# Patient Record
Sex: FEMALE | Race: WHITE | Employment: OTHER | URBAN - METROPOLITAN AREA
[De-identification: names, ages, dates, MRNs, and addresses within clinical notes are randomized per-mention and may not be internally consistent; named-entity substitution may affect disease eponyms.]

---

## 2018-07-01 ENCOUNTER — HOSPITAL ENCOUNTER (OUTPATIENT)
Dept: OTHER | Age: 68
Discharge: HOME | End: 2018-07-02

## 2018-07-02 ENCOUNTER — HOSPITAL ENCOUNTER (OUTPATIENT)
Dept: PHYSICAL THERAPY | Age: 68
Discharge: HOME | End: 2018-07-03

## 2018-07-02 ASSESSMENT — PAIN SCALES - GENERAL: PAINLEVEL_OUTOF10: 1

## 2018-07-02 NOTE — PROGRESS NOTES
Outpatient Physical Therapy  Phone: [phone] Fax: [phone]     To: Edenilson Wellington     From: Rosendo Bowser PT     Patient: Anselmo Galaviz      : 1950  Diagnosis: LBP, SI joint Dysfunction   Date: 2018  Treatment Diagnosis:      Physical Therapy Certification/Re-Certification Form  Dear Dr. Dano Ewing,  The following patient has been evaluated for physical therapy services and for therapy to continue, Medicare requires monthly physician review of the treatment plan. Please review the attached evaluation and/or summary of the patient's plan of care, and verify that you agree therapy should continue by signing the attached document and sending it back to our office. Plan of Care/Treatment to date:  [x] Therapeutic Exercise   [] Modalities:  [x] Therapeutic Activity     [] Ultrasound  [] Electrical Stimulation   [x] Gait Training      [] Cervical Traction [] Lumbar Traction  [x] Neuromuscular Re-education  [] Hot/Coldpack [] Iontophoresis    [x] Instruction in HEP      Other:  [x] Manual Therapy       []                        [] Aquatic Therapy       []                      ? Frequency/Duration:  # Days per week: [] 1 day # Weeks: [] 1 week [] 5 weeks      [x] 2 days? [] 2 weeks [] 6 weeks     [] 3 days   [] 3 weeks [] 7 weeks     [] 4 days   [x] 4 weeks [] 8 weeks    Rehab Potential: [] Excellent [x] Good [] Fair  [] Poor       Electronically signed by:  Rosendo Bowser PT      If you have any questions or concerns, please don't hesitate to call.   Thank you for your referral.    Physician Signature:________________________________Date:__________________  By signing above, therapists plan is approved by physician

## 2018-07-05 ENCOUNTER — HOSPITAL ENCOUNTER (OUTPATIENT)
Dept: PHYSICAL THERAPY | Age: 68
Discharge: HOME | End: 2018-07-06

## 2018-07-05 NOTE — FLOWSHEET NOTE
Physical Therapy Daily Treatment Note    Date:  2018    Patient Name:  Mei Fishman    :  1950  MRN: [id number]  Restrictions/Precautions:    Medical/Treatment Diagnosis Information:  Diagnosis: R SI Joint, LBP  Insurance/Certification information:  PT Insurance Information: Medicare  Physician Information:  Referring Practitioner: Alban Boggs  Plan of care signed (Y/N):  Y  Visit# / total visits:  2/8    G-Code (if applicable):         PT G-Codes  Functional Assessment Tool Used: Modified Oswestry  Score: 28% disability  Functional Limitation: Mobility: Walking and moving around  Mobility: Walking and Moving Around Current Status (): At least 20 percent but less than 40 percent impaired, limited or restricted  Mobility: Walking and Moving Around Goal Status (): At least 1 percent but less than 20 percent impaired, limited or restricted    Medicare Cap (if applicable):  307 = total amount used, updated 2018    Time in:  9:30     Timed Treatment: 30 Total Treatment Time:  30  ________________________________________________________________________________________    Pain Level:    1/10  SUBJECTIVE:  Pt reports that she is still feeling pain with walking on lateral R hip and has had to start stepping to with stairs. Pt reports that she has been doing HEP but without glute set for bridges and clamshells. OBJECTIVE:     Exercise/Equipment Resistance/Repetitions Other comments          TA set with BP cuff       With march       With KFO   Until achieved  x10  Instruction x2 min HEP video     Bridges 10x5\" HEP video     Clamshells x10 B HEP video    TG  x6 mins                   R hip IR/ER neuro re-ed   x8 mins                                                                                      Other Therapeutic Activities:      Manual Treatments:     Lumbar gapping gr 3 and 4 L2, L3, L4.  T6 distraction without cavitation set up only without thrust.  Prone lumbar rotation mobs

## 2018-07-11 ENCOUNTER — HOSPITAL ENCOUNTER (OUTPATIENT)
Dept: PHYSICAL THERAPY | Age: 68
Discharge: HOME | End: 2018-07-12

## 2018-07-11 NOTE — FLOWSHEET NOTE
Physical Therapy Daily Treatment Note    Date:  2018    Patient Name:  Jerardo Abbott    :  1950  MRN: [id number]  Restrictions/Precautions:    Medical/Treatment Diagnosis Information:  Diagnosis: R SI Joint, LBP  Insurance/Certification information:  PT Insurance Information: Medicare  Physician Information:  Referring Practitioner: Mary Sanchez  Plan of care signed (Y/N):  Y  Visit# / total visits:  3/8    G-Code (if applicable):         PT G-Codes  Functional Assessment Tool Used: Modified Oswestry  Score: 28% disability  Functional Limitation: Mobility: Walking and moving around  Mobility: Walking and Moving Around Current Status (): At least 20 percent but less than 40 percent impaired, limited or restricted  Mobility: Walking and Moving Around Goal Status (): At least 1 percent but less than 20 percent impaired, limited or restricted    Medicare Cap (if applicable):  174 = total amount used, updated 2018    Time in:  7:30     Timed Treatment: 30 Total Treatment Time:  30  ________________________________________________________________________________________    Pain Level:    1/10  SUBJECTIVE:  Pt reports feeling better after therapy last session and is now having times where she can go up steps with reciprocal gait. However, she does note significant pain in the buttocks and into the R LE the day following therapy. OBJECTIVE:     Exercise/Equipment Resistance/Repetitions Other comments          TA set with BP cuff       With march       With KFO   Until achieved  x10  nv HEP video     Bridges 10x5\" HEP video     Clamshells x10 B HEP video    TG  x6 mins                   R hip IR/ER neuro re-ed   x8 mins                                                                                      Other Therapeutic Activities: Pt instructed on morning HEP routine x10 mins    Manual Treatments:     Lumbar gapping gr 3 and 4 L2, L3, L4.  T6 distraction with cavitation set up only without thrust.  Prone lumbar rotation mobs gr 3-4. Prone gr 4 R L2 mob. Modalities:      Test/Measurements:      R  L  Hip  ER  25 degrees  25 degrees    IR  15 degrees 25 degrees        ASSESSMENT:     Pt tolerated treatment well. Pt demonstrated improved R hip ROM following manual therapy this session. Pt demonstrated improved control of TA activation without compensation from other abdominal musculature this session. Continue to progress POC as tolerated. Treatment/Activity Tolerance:   [x] Patient tolerated treatment well [] Patient limited by fatique  [] Patient limited by pain [] Patient limited by other medical complications  [] Other:     Goals:    Short term goals  Time Frame for Short term goals: 4 weeks  Short term goal 1: Pt independent with HEP  Short term goal 2: Pt to demonstrate segmental motion in lumbar spine with functional movement.     Short term goal 3: Gluteal strength to improve by 1/3 muscle grade R   Short term goal 4: Full AROM R hip all motions grossly  Short term goal 5: Up/down steps without limits to pain          Plan: [x] Continue per plan of care [] Alter current plan (see comments)   [] Plan of care initiated [] Hold pending MD visit [] Discharge      Plan for Next Session:      Re-Certification Due Date:         Signature:  Clayton Mejia, PT

## 2018-07-13 ENCOUNTER — HOSPITAL ENCOUNTER (OUTPATIENT)
Dept: PHYSICAL THERAPY | Age: 68
Discharge: HOME | End: 2018-07-14

## 2018-07-13 NOTE — FLOWSHEET NOTE
Physical Therapy Daily Treatment Note    Date:  2018    Patient Name:  Poppy Carrillo    :  1950  MRN: [id number]  Restrictions/Precautions:    Medical/Treatment Diagnosis Information:  Diagnosis: R SI Joint, LBP  Insurance/Certification information:  PT Insurance Information: Medicare  Physician Information:  Referring Practitioner: Berta Vela  Plan of care signed (Y/N):  Y  Visit# / total visits:      G-Code (if applicable):         PT G-Codes  Functional Assessment Tool Used: Modified Oswestry  Score: 28% disability  Functional Limitation: Mobility: Walking and moving around  Mobility: Walking and Moving Around Current Status (): At least 20 percent but less than 40 percent impaired, limited or restricted  Mobility: Walking and Moving Around Goal Status (): At least 1 percent but less than 20 percent impaired, limited or restricted    Medicare Cap (if applicable):  790 = total amount used, updated 2018    Time in:  7:25     Timed Treatment: 30 Total Treatment Time:  30  ________________________________________________________________________________________    Pain Level:    110  SUBJECTIVE: Pt reports her pain is significantly reduced but she is 'sore' in the R hip laterally. \"much rather be sore than painful'.        OBJECTIVE:     Exercise/Equipment Resistance/Repetitions Other comments          TA set with BP cuff       With march       With KFO   Reviewed x2 min HEP video   Bridges 10x5\" HEP video   Clamshells 1 and 2 x10 B review HEP video   TG  x6 mins     Standing stacking with MB   2x30 secs B           R hip IR/ER neuro re-ed              Supine anterior sling       Posterior sling     x2 mins B  x2 mins B                     Rockerboard nv      Standing hip abd  nv                                         Other Therapeutic Activities: Reviewed and performed morning HEP routine x10 mins    Manual Treatments:         Modalities:      Test/Measurements:

## 2018-07-17 ENCOUNTER — HOSPITAL ENCOUNTER (OUTPATIENT)
Dept: PHYSICAL THERAPY | Age: 68
Setting detail: THERAPIES SERIES
Discharge: HOME | End: 2018-07-17
Payer: MEDICARE

## 2018-07-17 PROCEDURE — 97110 THERAPEUTIC EXERCISES: CPT

## 2018-07-17 NOTE — FLOWSHEET NOTE
Physical Therapy Daily Treatment Note    Date:  2018    Patient Name:  Anselmo Galaviz    :  1950  MRN: [id number]  Restrictions/Precautions:    Medical/Treatment Diagnosis Information:  Diagnosis: R SI Joint, LBP  Insurance/Certification information:  PT Insurance Information: Medicare  Physician Information:  Referring Practitioner: Edenilson Wellington  Plan of care signed (Y/N):  Y  Visit# / total visits:      G-Code (if applicable):         PT G-Codes  Functional Assessment Tool Used: Modified Oswestry  Score: 28% disability  Functional Limitation: Mobility: Walking and moving around  Mobility: Walking and Moving Around Current Status (): At least 20 percent but less than 40 percent impaired, limited or restricted  Mobility: Walking and Moving Around Goal Status (): At least 1 percent but less than 20 percent impaired, limited or restricted    Medicare Cap (if applicable):  810 = total amount used, updated 2018    Time in:  8:30     Timed Treatment: 30 Total Treatment Time:  30  ________________________________________________________________________________________    Pain Level:    1/10  SUBJECTIVE: Pt reports she is feeling much better and states her morning routine is really helping her.     OBJECTIVE:     Exercise/Equipment Resistance/Repetitions Other comments          TA set with BP cuff       With march       With KFO   Reviewed x5 min with dowel barry HEP video   Bridges 6x5 secs  HEP video   Clamshells 1 and 2  HEP video   TG  x6 mins     Standing stacking with MB   2x30 secs B           R hip IR/ER neuro re-ed              Supine anterior sling       Posterior sling     x3 mins B  x3 mins B    Magnetic click with feet on TB    x30 secs              Rockerboard nv      Standing hip abd  nv                                         Other Therapeutic Activities: Reviewed and performed morning HEP routine x10 mins    Manual Treatments:         Modalities:

## 2018-07-19 ENCOUNTER — HOSPITAL ENCOUNTER (OUTPATIENT)
Dept: PHYSICAL THERAPY | Age: 68
Setting detail: THERAPIES SERIES
Discharge: HOME | End: 2018-07-19
Payer: MEDICARE

## 2018-07-19 PROCEDURE — 97140 MANUAL THERAPY 1/> REGIONS: CPT

## 2018-07-19 PROCEDURE — 97110 THERAPEUTIC EXERCISES: CPT

## 2018-07-19 NOTE — FLOWSHEET NOTE
Physical Therapy Daily Treatment Note    Date:  2018    Patient Name:  Derek Garza    :  1950  MRN: [id number]  Restrictions/Precautions:    Medical/Treatment Diagnosis Information:  Diagnosis: R SI Joint, LBP  Insurance/Certification information:  PT Insurance Information: Medicare  Physician Information:  Referring Practitioner: Sheryl Nunez  Plan of care signed (Y/N):  Y  Visit# / total visits:      G-Code (if applicable):         PT G-Codes  Functional Assessment Tool Used: Modified Oswestry  Score: 28% disability  Functional Limitation: Mobility: Walking and moving around  Mobility: Walking and Moving Around Current Status (): At least 20 percent but less than 40 percent impaired, limited or restricted  Mobility: Walking and Moving Around Goal Status (): At least 1 percent but less than 20 percent impaired, limited or restricted    Medicare Cap (if applicable):  586 = total amount used, updated 2018    Time in:  9:30     Timed Treatment: 30 Total Treatment Time:  30  ________________________________________________________________________________________    Pain Level:    1/10  SUBJECTIVE:   Pt reports increased pain in RLE after carrying a hose yesterday.     OBJECTIVE:     Exercise/Equipment Resistance/Repetitions Other comments          TA set with BP cuff       With march       With KFO   Reviewed x5 min with dowel barry HEP video   Bridges   HEP video   Clamshells 1 and 2  HEP video   TG  x6 mins     Standing stacking with MB       3 ball compression   2x30 secs forward  10x5 secs diagonally    R hip IR/ER neuro re-ed              Supine anterior sling       Posterior sling     x3 mins B  x3 mins B    Magnetic click with feet on TB                 Rockerboard F/B and M/L rocking and balance 1'      Standing hip abd  2# 2x15 B                                         Other Therapeutic Activities: Reviewed and performed morning HEP routine x10 mins    Manual Treatments:     Lumbar gapping gr 3 and 4 L2, L3, L4. .  Prone lumbar rotation mobs gr 3-4. STM R QL followed by manual stretching. Modalities:      Test/Measurements:      R  L  Hip  ER  25 degrees  25 degrees    IR  15 degrees 25 degrees        ASSESSMENT:     Pt doing well with therapy overall. Significant tenderness noted in R QL and lumbar paraspinals this session. Pt demonstrated decreased pain following manual therapy. Treatment this session focused on correct posture with functional activity to prevent flare ups. Pt able to demonstrate core activation in standing this session. Progress standing exercise nv. Treatment/Activity Tolerance:   [x] Patient tolerated treatment well [] Patient limited by fatique  [] Patient limited by pain [] Patient limited by other medical complications  [] Other:     Goals:    Short term goals  Time Frame for Short term goals: 4 weeks  Short term goal 1: Pt independent with HEP  Short term goal 2: Pt to demonstrate segmental motion in lumbar spine with functional movement. Short term goal 3: Gluteal strength to improve by 1/3 muscle grade R   Short term goal 4: Full AROM R hip all motions grossly  Short term goal 5: Up/down steps without limits to pain          Plan: [x] Continue per plan of care [] Alter current plan (see comments)   [] Plan of care initiated [] Hold pending MD visit [] Discharge      Plan for Next Session:      Re-Certification Due Date:         Signature:  Dennis Joyce PT    Misti Loomis, SPT Physical Therapist was present, directed the patient's care, made skilled judgement, and was responsible for assessment and treatment of the patient.

## 2018-07-31 ENCOUNTER — HOSPITAL ENCOUNTER (OUTPATIENT)
Dept: PHYSICAL THERAPY | Age: 68
Setting detail: THERAPIES SERIES
Discharge: HOME | End: 2018-07-31
Payer: MEDICARE

## 2018-07-31 PROCEDURE — 97140 MANUAL THERAPY 1/> REGIONS: CPT

## 2018-07-31 PROCEDURE — 97110 THERAPEUTIC EXERCISES: CPT

## 2018-07-31 NOTE — FLOWSHEET NOTE
2x15 B           Standing lateral sling with maroon TB   x10 B     Standing multifidus with maroon TB   x15 B                     Other Therapeutic Activities: Reviewed and performed morning HEP routine x10 mins    Manual Treatments:     Neural STM and flossing R sciatic gluteal into gastroc. Gr 3-4 M/L R tibio-femoral glides. Modalities:      Test/Measurements:      R  L  Hip  ER  25 degrees  25 degrees    IR  15 degrees 25 degrees        ASSESSMENT:     Pt doing well with therapy overall. Treatment/Activity Tolerance:   [x] Patient tolerated treatment well [] Patient limited by fatique  [] Patient limited by pain [] Patient limited by other medical complications  [] Other:     Goals:    Short term goals  Time Frame for Short term goals: 4 weeks  Short term goal 1: Pt independent with HEP  Short term goal 2: Pt to demonstrate segmental motion in lumbar spine with functional movement. Short term goal 3: Gluteal strength to improve by 1/3 muscle grade R   Short term goal 4: Full AROM R hip all motions grossly  Short term goal 5: Up/down steps without limits to pain          Plan: [x] Continue per plan of care [] Alter current plan (see comments)   [] Plan of care initiated [] Hold pending MD visit [] Discharge      Plan for Next Session:      Re-Certification Due Date:         Signature:  Duane Quintero PT    Jean Jo, LEISA Physical Therapist was present, directed the patient's care, made skilled judgement, and was responsible for assessment and treatment of the patient.

## 2018-08-03 ENCOUNTER — HOSPITAL ENCOUNTER (OUTPATIENT)
Dept: PHYSICAL THERAPY | Age: 68
Setting detail: THERAPIES SERIES
Discharge: HOME | End: 2018-08-03
Payer: MEDICARE

## 2018-08-03 PROCEDURE — 97110 THERAPEUTIC EXERCISES: CPT

## 2018-08-03 PROCEDURE — 97140 MANUAL THERAPY 1/> REGIONS: CPT

## 2018-08-06 ENCOUNTER — HOSPITAL ENCOUNTER (OUTPATIENT)
Dept: PHYSICAL THERAPY | Age: 68
Setting detail: THERAPIES SERIES
Discharge: HOME | End: 2018-08-06
Payer: MEDICARE

## 2018-08-06 PROCEDURE — 97140 MANUAL THERAPY 1/> REGIONS: CPT

## 2018-08-06 PROCEDURE — 97110 THERAPEUTIC EXERCISES: CPT

## 2018-08-06 NOTE — FLOWSHEET NOTE
multifidus with maroon TB   x15 B     Standing posterior sling   x15 B with maroon TB 6\" step             Other Therapeutic Activities: Reviewed and performed morning HEP routine x10 mins    Manual Treatments:     Lumbar gapping gr 3 and 4 L2, L3, L4. T6 distraction without cavitation set up only without thrust.  Prone lumbar rotation mobs gr 3-4. Neural STM and flossing R sciatic gluteal into gastroc. Dyer Archbald LAD B LE followed by B hip IR/ER neuro re-ed. Modalities:      Test/Measurements:      R  L  Hip  ER  25 degrees  25 degrees    IR  15 degrees 25 degrees        ASSESSMENT:     Pt doing well with therapy overall with decreased pain and improved mobility. Continue to progress POC as tolerated. Treatment/Activity Tolerance:   [x] Patient tolerated treatment well [] Patient limited by fatique  [] Patient limited by pain [] Patient limited by other medical complications  [] Other:     Goals:    Short term goals  Time Frame for Short term goals: 4 weeks  Short term goal 1: Pt independent with HEP  (met)  Short term goal 2: Pt to demonstrate segmental motion in lumbar spine with functional movement.   (improved)  Short term goal 3: Gluteal strength to improve by 1/3 muscle grade R (improving)  Short term goal 4: Full AROM R hip all motions grossly (improved)  Short term goal 5: Up/down steps without limits to pain  (improved)          Plan: [x] Continue per plan of care [] Alter current plan (see comments)   [] Plan of care initiated [] Hold pending MD visit [] Discharge      Plan for Next Session:      Re-Certification Due Date:         Signature:  Duane Quintero PT

## 2018-08-08 ENCOUNTER — HOSPITAL ENCOUNTER (OUTPATIENT)
Dept: PHYSICAL THERAPY | Age: 68
Setting detail: THERAPIES SERIES
Discharge: HOME | End: 2018-08-08
Payer: MEDICARE

## 2018-08-08 PROCEDURE — 97140 MANUAL THERAPY 1/> REGIONS: CPT

## 2018-08-08 PROCEDURE — 97110 THERAPEUTIC EXERCISES: CPT

## 2018-08-08 NOTE — FLOWSHEET NOTE
Physical Therapy Daily Treatment Note    Date:  2018    Patient Name:  Leila Friedman    :  1950  MRN: [id number]  Restrictions/Precautions:    Medical/Treatment Diagnosis Information:  Diagnosis: R SI Joint, LBP  Insurance/Certification information:  PT Insurance Information: Medicare  Physician Information:  Referring Practitioner: Edgardo Dougherty  Plan of care signed (Y/N):  Y  Visit# / total visits:      G-Code (if applicable):         PT G-Codes  Functional Assessment Tool Used: Modified Oswestry  Score: 8/3/18   12% disability   At initial evaluation 28% disability  Functional Limitation: Mobility: Walking and moving around  Mobility: Walking and Moving Around Current Status (): At least 1 percent but less than 20 percent impaired, limited or restricted  Mobility: Walking and Moving Around Goal Status (): At least 1 percent but less than 20 percent impaired, limited or restricted    Medicare Cap (if applicable):  544 = total amount used, updated 2018    Time in:  8:20     Timed Treatment: 40 Total Treatment Time:  40  ________________________________________________________________________________________    Pain Level:    3-4/10  SUBJECTIVE:  Pt reports having 'problems with the R hip again since yesterday.'  Pain intensity isn't too bad.       OBJECTIVE:      Exercise/Equipment Resistance/Repetitions Other comments          TA set with BP cuff       With march       With KFO    HEP video   Bridges   HEP video   Clamshells 1 and 2  HEP video   TG  x6 mins     Standing stacking with MB       3 ball compression   2x30 secs forward  10x5 secs diagonally    R hip IR/ER neuro re-ed              Supine anterior sling       Posterior sling       x3 mins B    Magnetic click with feet on TB                 Rockerboard x1 min F/B M/L  x1 min rocking each      Standing hip abd             Standing lateral sling with maroon TB   x10 B     Standing multifidus with maroon TB   x15 B Standing posterior sling                Other Therapeutic Activities: Reviewed and performed morning HEP routine x10 mins    Manual Treatments:     Lumbar gapping gr 3 and 4 L2, L3, L4. T6 distraction without cavitation set up only without thrust.  Prone lumbar rotation mobs gr 3-4. Neural STM and flossing R sciatic gluteal into gastroc. Maryann Estrada LAD B LE followed by B hip IR/ER neuro re-ed. R ITB STM and ART followed by manual stretching. Modalities:      Test/Measurements:      R  L  Hip  ER  25 degrees  25 degrees    IR  15 degrees 25 degrees     L sacral SB        ASSESSMENT:    Pt responded well to manual therapy today with decreased R thigh and hip pain to 0/10. Increased ITB tightness noted in R that improved with manual release. Continue to progress POC as tolerated. Treatment/Activity Tolerance:   [x] Patient tolerated treatment well [] Patient limited by fatique  [] Patient limited by pain [] Patient limited by other medical complications  [] Other:     Goals:    Short term goals  Time Frame for Short term goals: 4 weeks  Short term goal 1: Pt independent with HEP  (met)  Short term goal 2: Pt to demonstrate segmental motion in lumbar spine with functional movement.   (improved)  Short term goal 3: Gluteal strength to improve by 1/3 muscle grade R (improving)  Short term goal 4: Full AROM R hip all motions grossly (improved)  Short term goal 5: Up/down steps without limits to pain  (improved)          Plan: [x] Continue per plan of care [] Alter current plan (see comments)   [] Plan of care initiated [] Hold pending MD visit [] Discharge      Plan for Next Session:      Re-Certification Due Date:         Signature:  Janice Cavanaugh, PT

## 2018-08-14 ENCOUNTER — HOSPITAL ENCOUNTER (OUTPATIENT)
Dept: PHYSICAL THERAPY | Age: 68
Setting detail: THERAPIES SERIES
Discharge: HOME | End: 2018-08-14
Payer: MEDICARE

## 2018-08-14 PROCEDURE — 97110 THERAPEUTIC EXERCISES: CPT

## 2018-08-14 PROCEDURE — 97140 MANUAL THERAPY 1/> REGIONS: CPT

## 2018-08-14 NOTE — FLOWSHEET NOTE
Standing posterior sling   x10 B              Other Therapeutic Activities: Reviewed and performed morning HEP routine x10 mins    Manual Treatments:     Lumbar gapping gr 3 and 4 L2, L3, L4. Prone lumbar rotation mobs gr 3-4. R ITB STM and ART followed by manual stretching. Modalities:      Test/Measurements:      R  L  Hip  ER  25 degrees  25 degrees    IR  15 degrees 25 degrees     L sacral SB        ASSESSMENT:    Pt responded well to manual therapy today with decreased R thigh and hip pain to 0/10. Increased ITB tightness noted in R that improved with manual release. Continue to progress POC as tolerated. Treatment/Activity Tolerance:   [x] Patient tolerated treatment well [] Patient limited by fatique  [] Patient limited by pain [] Patient limited by other medical complications  [] Other:     Goals:    Short term goals  Time Frame for Short term goals: 4 weeks  Short term goal 1: Pt independent with HEP  (met)  Short term goal 2: Pt to demonstrate segmental motion in lumbar spine with functional movement.   (improved)  Short term goal 3: Gluteal strength to improve by 1/3 muscle grade R (improving)  Short term goal 4: Full AROM R hip all motions grossly (improved)  Short term goal 5: Up/down steps without limits to pain  (improved)          Plan: [x] Continue per plan of care [] Alter current plan (see comments)   [] Plan of care initiated [] Hold pending MD visit [] Discharge      Plan for Next Session:      Re-Certification Due Date:         Signature:  Wille Merlin, PT

## 2018-08-16 ENCOUNTER — HOSPITAL ENCOUNTER (OUTPATIENT)
Dept: PHYSICAL THERAPY | Age: 68
Setting detail: THERAPIES SERIES
Discharge: HOME | End: 2018-08-16
Payer: MEDICARE

## 2018-08-16 PROCEDURE — 97140 MANUAL THERAPY 1/> REGIONS: CPT

## 2018-08-16 PROCEDURE — 97110 THERAPEUTIC EXERCISES: CPT

## 2018-08-16 NOTE — FLOWSHEET NOTE
with maroon TB       Standing multifidus with maroon TB       Standing posterior sling                Other Therapeutic Activities: Reviewed and performed morning HEP routine x10 mins    Manual Treatments:     Lumbar gapping gr 3 and 4 L2, L3, L4. Prone lumbar rotation mobs gr 3-4. R ITB STM and ART followed by manual stretching. R hip hit technique. Modalities:      Test/Measurements:      R  L  Hip  ER  25 degrees  25 degrees    IR  15 degrees 25 degrees     L sacral SB        ASSESSMENT:    Pt responded well to manual therapy today with decreased R thigh and hip pain to 0/10. Increased ITB tightness noted in R that improved with manual release. Continue to progress POC as tolerated. Treatment/Activity Tolerance:   [x] Patient tolerated treatment well [] Patient limited by fatique  [] Patient limited by pain [] Patient limited by other medical complications  [] Other:     Goals:    Short term goals  Time Frame for Short term goals: 4 weeks  Short term goal 1: Pt independent with HEP  (met)  Short term goal 2: Pt to demonstrate segmental motion in lumbar spine with functional movement.   (improved)  Short term goal 3: Gluteal strength to improve by 1/3 muscle grade R (improving)  Short term goal 4: Full AROM R hip all motions grossly (improved)  Short term goal 5: Up/down steps without limits to pain  (improved)          Plan: [x] Continue per plan of care [] Alter current plan (see comments)   [] Plan of care initiated [] Hold pending MD visit [] Discharge      Plan for Next Session:      Re-Certification Due Date:         Signature:  Kirsten Goel, PT

## 2018-08-20 ENCOUNTER — HOSPITAL ENCOUNTER (OUTPATIENT)
Dept: PHYSICAL THERAPY | Age: 68
Setting detail: THERAPIES SERIES
Discharge: HOME | End: 2018-08-20
Payer: MEDICARE

## 2018-08-20 PROCEDURE — 97110 THERAPEUTIC EXERCISES: CPT

## 2018-08-20 PROCEDURE — 97140 MANUAL THERAPY 1/> REGIONS: CPT

## 2018-08-20 NOTE — FLOWSHEET NOTE
Physical Therapy Daily Treatment Note    Date:  2018    Patient Name:  Mckenna Friedman    :  1950  MRN: [id number]  Restrictions/Precautions:    Medical/Treatment Diagnosis Information:  Diagnosis: R SI Joint, LBP  Insurance/Certification information:  PT Insurance Information: Medicare  Physician Information:  Referring Practitioner: Kimberly Bergeron  Plan of care signed (Y/N):  Y  Visit# / total visits:      G-Code (if applicable):         PT G-Codes  Functional Assessment Tool Used: Modified Oswestry  Score: 8/3/18   12% disability   At initial evaluation 28% disability  Functional Limitation: Mobility: Walking and moving around  Mobility: Walking and Moving Around Current Status (): At least 1 percent but less than 20 percent impaired, limited or restricted  Mobility: Walking and Moving Around Goal Status (): At least 1 percent but less than 20 percent impaired, limited or restricted    Medicare Cap (if applicable):  911 = total amount used, updated 2018    Time in:  9:30     Timed Treatment: 40 Total Treatment Time: 40 ________________________________________________________________________________________    Pain Level:    0-1/10  SUBJECTIVE:  Pt reports doing much better with less pain. R lateral thigh pain, lower leg with walking and up steps but much less intense. Before therapy pt had pain all the time. Pain in the lower leg is worsened. Pt does note she is able to cross her legs now.             OBJECTIVE:      Exercise/Equipment Resistance/Repetitions Other comments   Sciatic flossing   x15 B HEP    TA set with BP cuff       With march       With KFO    HEP video   Bridges 10x5 secs  HEP video   Clamshells 1 and 2  HEP video   TG  x6 mins     Standing stacking with MB       3 ball compression       R hip IR/ER neuro re-ed       Prone hip extension   10x5 secs  HEP video   Supine anterior sling       Posterior sling         Magnetic click with feet on TB Standing hip flexor stretch  x4 mins  HEP video     Rockerboard x1 min F/B M/L  x1 min rocking each      Standing hip abd      Gastroc stretch on the wall   4x30 secs HEP   Standing lateral sling with maroon TB       Standing multifidus with maroon TB       Standing posterior sling       Tandem stance    2x30 secs B      Other Therapeutic Activities: Reviewed and performed morning HEP routine x10 mins    Manual Treatments:     Lumbar gapping gr 3 and 4 L2, L3, L4. Prone lumbar rotation mobs gr 3-4. Neural STM and flossing R sciatic gluteal into gastroc. R ITB STM and ART followed by manual stretching. R hip hit technique. Modalities:      Test/Measurements:      R  L  Hip  ER  25 degrees  25 degrees    IR  15 degrees 25 degrees     L sacral SB        ASSESSMENT:    Pt responded well to manual therapy today with decreased R thigh and hip pain to 0/10. Increased ITB tightness noted in R that improved with manual release. Continue to progress POC as tolerated. Treatment/Activity Tolerance:   [x] Patient tolerated treatment well [] Patient limited by fatique  [] Patient limited by pain [] Patient limited by other medical complications  [] Other:     Goals:    Short term goals  Time Frame for Short term goals: 4 weeks  Short term goal 1: Pt independent with HEP  (met)  Short term goal 2: Pt to demonstrate segmental motion in lumbar spine with functional movement.   (improved)  Short term goal 3: Gluteal strength to improve by 1/3 muscle grade R (improving)  Short term goal 4: Full AROM R hip all motions grossly (improved)  Short term goal 5: Up/down steps without limits to pain  (improved)          Plan: [x] Continue per plan of care [] Alter current plan (see comments)   [] Plan of care initiated [] Hold pending MD visit [] Discharge      Plan for Next Session:      Re-Certification Due Date:         Signature:  Paige Joseph, PT

## 2018-08-23 ENCOUNTER — HOSPITAL ENCOUNTER (OUTPATIENT)
Dept: PHYSICAL THERAPY | Age: 68
Setting detail: THERAPIES SERIES
Discharge: HOME | End: 2018-08-23
Payer: MEDICARE

## 2018-08-23 PROCEDURE — 97140 MANUAL THERAPY 1/> REGIONS: CPT

## 2018-08-23 PROCEDURE — 97110 THERAPEUTIC EXERCISES: CPT

## 2018-08-23 NOTE — FLOWSHEET NOTE
Physical Therapy Daily Treatment Note    Date:  2018    Patient Name:  Carey Langston    :  1950  MRN: [id number]  Restrictions/Precautions:    Medical/Treatment Diagnosis Information:  Diagnosis: R SI Joint, LBP  Insurance/Certification information:  PT Insurance Information: Medicare  Physician Information:  Referring Practitioner: Rosa M Hunt  Plan of care signed (Y/N):  Y  Visit# / total visits:      G-Code (if applicable):         PT G-Codes  Functional Assessment Tool Used: Modified Oswestry  Score: 8/3/18   12% disability   At initial evaluation 28% disability  Functional Limitation: Mobility: Walking and moving around  Mobility: Walking and Moving Around Current Status (): At least 1 percent but less than 20 percent impaired, limited or restricted  Mobility: Walking and Moving Around Goal Status (): At least 1 percent but less than 20 percent impaired, limited or restricted    Medicare Cap (if applicable):  049 = total amount used, updated 2018    Time in:  9:30     Timed Treatment: 40 Total Treatment Time: 40 ________________________________________________________________________________________    Pain Level:    3-4/10  SUBJECTIVE:  Pt reports that her pain comes and goes. Mostly centralized over the lateral hip and into lateral thigh. \"Not so much in the lower leg anymore. \" \"I think if I do the exercises over a longer period of time that it will get better. \"           OBJECTIVE:      Exercise/Equipment Resistance/Repetitions Other comments   Sciatic flossing   x15 B HEP    TA set with BP cuff       With march       With KFO   Until achieved  x10  x10 HEP video   Bridges 10x5 secs  HEP video   Clamshells 1 and 2  HEP video   TG  x6 mins     Standing stacking with MB       3 ball compression       R hip IR/ER neuro re-ed       Prone hip extension    HEP video   Supine anterior sling       Posterior sling         Magnetic click with feet on TB        Standing hip flexor stretch  x4 mins  HEP video     Rockerboard x1 min F/B M/L  x1 min rocking each      Standing hip abd      Gastroc stretch on the wall   4x30 secs HEP   Standing lateral sling with maroon TB       Standing multifidus with maroon TB       Standing posterior sling   x15 B 4\" step    Tandem stance          Other Therapeutic Activities: Reviewed and performed morning HEP routine x10 mins    Manual Treatments:     Lumbar gapping gr 3 and 4 L2, L3, L4. Prone lumbar rotation mobs gr 3-4. Neural STM and flossing R sciatic gluteal into gastroc. Modalities:      Test/Measurements:      R  L  Hip  ER  25 degrees  25 degrees    IR  15 degrees 25 degrees     L sacral SB        ASSESSMENT:    Pt responded well to manual therapy today with decreased R thigh and hip pain to 0/10. Pt demonstrated difficulty achieving and maintaining TA contraction with BP cuff training. Continue to progress POC as tolerated. Treatment/Activity Tolerance:   [x] Patient tolerated treatment well [] Patient limited by fatique  [] Patient limited by pain [] Patient limited by other medical complications  [] Other:     Goals:    Short term goals  Time Frame for Short term goals: 4 weeks  Short term goal 1: Pt independent with HEP  (met)  Short term goal 2: Pt to demonstrate segmental motion in lumbar spine with functional movement.   (improved)  Short term goal 3: Gluteal strength to improve by 1/3 muscle grade R (improving)  Short term goal 4: Full AROM R hip all motions grossly (improved)  Short term goal 5: Up/down steps without limits to pain  (improved)          Plan: [x] Continue per plan of care [] Alter current plan (see comments)   [] Plan of care initiated [] Hold pending MD visit [] Discharge      Plan for Next Session:      Re-Certification Due Date:         Signature:  Karel Vaz PT

## 2018-08-28 ENCOUNTER — HOSPITAL ENCOUNTER (OUTPATIENT)
Dept: PHYSICAL THERAPY | Age: 68
Setting detail: THERAPIES SERIES
Discharge: HOME | End: 2018-08-28
Payer: MEDICARE

## 2018-08-28 PROCEDURE — 97110 THERAPEUTIC EXERCISES: CPT

## 2018-08-28 PROCEDURE — 97140 MANUAL THERAPY 1/> REGIONS: CPT

## 2018-08-31 ENCOUNTER — HOSPITAL ENCOUNTER (OUTPATIENT)
Dept: PHYSICAL THERAPY | Age: 68
Setting detail: THERAPIES SERIES
Discharge: HOME | End: 2018-08-31
Payer: MEDICARE

## 2018-08-31 PROCEDURE — 97110 THERAPEUTIC EXERCISES: CPT

## 2018-08-31 PROCEDURE — 97140 MANUAL THERAPY 1/> REGIONS: CPT

## 2018-08-31 NOTE — FLOWSHEET NOTE
Physical Therapy Daily Treatment Note    Date:  2018    Patient Name:  Roque Saravia    :  1950  MRN: [id number]  Restrictions/Precautions:    Medical/Treatment Diagnosis Information:  Diagnosis: R SI Joint, LBP  Insurance/Certification information:  PT Insurance Information: Medicare  Physician Information:  Referring Practitioner: Reese Mejia  Plan of care signed (Y/N):  Y  Visit# / total visits:      G-Code (if applicable):         PT G-Codes  Functional Assessment Tool Used: Modified Oswestry  Score: 18   4% disability   8/3/18   12% disability   At initial evaluation 28% disability  Functional Limitation: Mobility: Walking and moving around  Mobility: Walking and Moving Around Current Status (): At least 1 percent but less than 20 percent impaired, limited or restricted  Mobility: Walking and Moving Around Goal Status (): At least 1 percent but less than 20 percent impaired, limited or restricted    Medicare Cap (if applicable):  5861 = total amount used, updated 2018    Time in:  8:30     Timed Treatment: 40 Total Treatment Time: 40 ________________________________________________________________________________________    Pain Level:    1-2/10  SUBJECTIVE:  Pt reports that she is doing much better and not having as much pain. Pt has returned to PLOF with only a mild pain at times. Pt feels comfortable doing her HEP and being D/C.              OBJECTIVE:      Exercise/Equipment Resistance/Repetitions Other comments   Sciatic flossing    HEP    TA set with BP cuff       With march       With KFO    HEP video   Bridges 10x5 secs  HEP video   Clamshells 1 and 2  HEP video   TG  x6 mins     Standing stacking with MB   2x30 secs     3 ball compression       R hip IR/ER neuro re-ed       Prone hip extension    HEP video   Supine anterior sling       Posterior sling         Magnetic click with feet on TB        Standing hip flexor stretch  x4 mins  HEP video

## 2018-08-31 NOTE — PROGRESS NOTES
Physical Therapy Daily Treatment Note    Date:  2018    Patient Name:  Mckenna Friedman    :  1950  MRN: [id number]  Restrictions/Precautions:    Medical/Treatment Diagnosis Information:  Diagnosis: R SI Joint, LBP  Insurance/Certification information:  PT Insurance Information: Medicare  Physician Information:  Referring Practitioner: Kimberly Bergeron  Plan of care signed (Y/N):  Y  Visit# / total visits:      G-Code (if applicable):         PT G-Codes  Functional Assessment Tool Used: Modified Oswestry  Score: 18   4% disability   8/3/18   12% disability   At initial evaluation 28% disability  Functional Limitation: Mobility: Walking and moving around  Mobility: Walking and Moving Around Current Status (): At least 1 percent but less than 20 percent impaired, limited or restricted  Mobility: Walking and Moving Around Goal Status (): At least 1 percent but less than 20 percent impaired, limited or restricted    Medicare Cap (if applicable):  6061 = total amount used, updated 2018    Time in:  8:30     Timed Treatment: 40 Total Treatment Time: 40 ________________________________________________________________________________________    Pain Level:    1-2/10  SUBJECTIVE:  Pt reports that she is doing much better and not having as much pain. Pt has returned to PLOF with only a mild pain at times. Pt feels comfortable doing her HEP and being D/C. OBJECTIVE:    Test/Measurements:    Hip  ER  WNL B    IR  WNL B (less mobile on L)  Hip  ER  4/5 B   IR  4+/5 B        ASSESSMENT:    Pt has responded well to therapy with decreased R thigh and hip pain to 0/10 and returned to PLOF. Pt has met 4/5 goals and is doing much better with regard to movement impairment that she initially presented with. Recommend pt continue with HEP independently and D/C to HEP after 4 weeks. Recommend PRN PT over the next 4 weeks to deal with flare up if it occurs. Treatment/Activity Tolerance:   [x] Patient tolerated treatment well [] Patient limited by fatique  [] Patient limited by pain [] Patient limited by other medical complications  [] Other:     Goals:    Short term goals  Time Frame for Short term goals: 4 weeks  Short term goal 1: Pt independent with HEP  (met)  Short term goal 2: Pt to demonstrate segmental motion in lumbar spine with functional movement.  (met)  Short term goal 3: Gluteal strength to improve by 1/3 muscle grade R (met)  Short term goal 4: Full AROM R hip all motions grossly (met)  Short term goal 5: Up/down steps without limits to pain  (improved)          Plan: [x] Continue per plan of care [] Alter current plan (see comments)   [] Plan of care initiated [] Hold pending MD visit [] Discharge      Plan for Next Session:      Re-Certification Due Date:         Signature:  Radha So, PT

## 2018-09-19 ENCOUNTER — HOSPITAL ENCOUNTER (OUTPATIENT)
Dept: PHYSICAL THERAPY | Age: 68
Setting detail: THERAPIES SERIES
Discharge: HOME | End: 2018-09-19
Payer: MEDICARE

## 2018-09-19 PROCEDURE — 97110 THERAPEUTIC EXERCISES: CPT

## 2018-09-19 PROCEDURE — 97140 MANUAL THERAPY 1/> REGIONS: CPT

## 2018-09-19 NOTE — FLOWSHEET NOTE
stretch  x4 mins  HEP video     Rockerboard x1 min F/B M/L  x1 min rocking each      Standing hip abd      Gastroc stretch on the wall    HEP   Standing lateral sling with maroon TB       Standing multifidus with maroon TB       Standing posterior sling   x15 B 4\" step    Tandem stance     R Hip flexor neuro re-ed   X sev mins      Other Therapeutic Activities: Reviewed and performed morning HEP routine x10 mins    Manual Treatments:     Lumbar gapping gr 3 and 4 L2, L3, L4. T6 distraction without cavitation set up only without thrust.  Prone lumbar rotation mobs gr 3-4. LAD B LE followed by B hip IR/ER neuro re-ed. Iliopsoas release followed by manual stretching. Corrected R AI with gr 4 mobilization. Pubic symphesis shotgun technique followed by B LE pulling. STM R iliopsoas x 5'. Modalities:      Test/Measurements:    Hip  ER  WNL B    IR  WNL B (less mobile on L)  Hip  ER  4/5 B   IR  4+/5 B     LLD R longer than L  R AI       ASSESSMENT:    Pt has responded well to therapy with decreased R thigh and hip pain to 0/10 and returned to PLOF. Pt has met 4/5 goals and is doing much better with regard to movement impairment that she initially presented with. Recommend pt continue with HEP independently and D/C to HEP after 2 weeks.               Treatment/Activity Tolerance:   [x] Patient tolerated treatment well [] Patient limited by fatique  [] Patient limited by pain [] Patient limited by other medical complications  [] Other:     Goals:    Short term goals  Time Frame for Short term goals: 4 weeks  Short term goal 1: Pt independent with HEP  (met)  Short term goal 2: Pt to demonstrate segmental motion in lumbar spine with functional movement.  (met)  Short term goal 3: Gluteal strength to improve by 1/3 muscle grade R (met)  Short term goal 4: Full AROM R hip all motions grossly (met)  Short term goal 5: Up/down steps without limits to pain  (improved)          Plan: [x] Continue per plan of care [] Alter

## 2022-02-11 NOTE — FLOWSHEET NOTE
Physical Therapy Daily Treatment Note    Date:  2018    Patient Name:  Daysi Quiñones    :  1950  MRN: [id number]  Restrictions/Precautions:    Medical/Treatment Diagnosis Information:  Diagnosis: R SI Joint, LBP  Insurance/Certification information:  PT Insurance Information: Medicare  Physician Information:  Referring Practitioner: Kim Butler  Plan of care signed (Y/N):  Y  Visit# / total visits:      G-Code (if applicable):         PT G-Codes  Functional Assessment Tool Used: Modified Oswestry  Score: 8/3/18   12% disability   At initial evaluation 28% disability  Functional Limitation: Mobility: Walking and moving around  Mobility: Walking and Moving Around Current Status (): At least 1 percent but less than 20 percent impaired, limited or restricted  Mobility: Walking and Moving Around Goal Status (): At least 1 percent but less than 20 percent impaired, limited or restricted    Medicare Cap (if applicable):  318 = total amount used, updated 2018    Time in:  8:30     Timed Treatment: 40 Total Treatment Time: 40 ________________________________________________________________________________________    Pain Level:    1-2/10  SUBJECTIVE:  Pt reports that she is doing much better and not having as much pain. Still has pain after activity but improved. Pt feels comfortable doing her HEP and being D/C NV.            OBJECTIVE:      Exercise/Equipment Resistance/Repetitions Other comments   Sciatic flossing   x15 B HEP    TA set with BP cuff       With march       With KFO    HEP video   Bridges 10x5 secs  HEP video   Clamshells 1 and 2  HEP video   TG  x6 mins     Standing stacking with MB   2x30 secs     3 ball compression       R hip IR/ER neuro re-ed       Prone hip extension    HEP video   Supine anterior sling       Posterior sling         Magnetic click with feet on TB        Standing hip flexor stretch  x4 mins  HEP video     Rockerboard x1 min F/B M/L  x1 min Home